# Patient Record
Sex: MALE | Race: BLACK OR AFRICAN AMERICAN | NOT HISPANIC OR LATINO | Employment: FULL TIME | URBAN - METROPOLITAN AREA
[De-identification: names, ages, dates, MRNs, and addresses within clinical notes are randomized per-mention and may not be internally consistent; named-entity substitution may affect disease eponyms.]

---

## 2024-04-19 ENCOUNTER — HOSPITAL ENCOUNTER (EMERGENCY)
Facility: HOSPITAL | Age: 24
Discharge: PSYCHIATRIC OR SUBSTANCE USE FACILITY | End: 2024-04-20
Attending: EMERGENCY MEDICINE

## 2024-04-19 DIAGNOSIS — F19.10 POLYSUBSTANCE ABUSE: ICD-10-CM

## 2024-04-19 DIAGNOSIS — Z00.8 MEDICAL CLEARANCE FOR PSYCHIATRIC ADMISSION: ICD-10-CM

## 2024-04-19 DIAGNOSIS — F30.9 MANIA: Primary | ICD-10-CM

## 2024-04-19 DIAGNOSIS — R41.82 ALTERED MENTAL STATUS, UNSPECIFIED ALTERED MENTAL STATUS TYPE: ICD-10-CM

## 2024-04-19 LAB
ALBUMIN SERPL BCP-MCNC: 4.9 G/DL (ref 3.5–5.2)
ALP SERPL-CCNC: 65 U/L (ref 55–135)
ALT SERPL W/O P-5'-P-CCNC: 23 U/L (ref 10–44)
ANION GAP SERPL CALC-SCNC: 14 MMOL/L (ref 8–16)
APAP SERPL-MCNC: <3 UG/ML (ref 10–20)
AST SERPL-CCNC: 17 U/L (ref 10–40)
BASOPHILS # BLD AUTO: 0.03 K/UL (ref 0–0.2)
BASOPHILS NFR BLD: 0.4 % (ref 0–1.9)
BILIRUB SERPL-MCNC: 0.7 MG/DL (ref 0.1–1)
BUN SERPL-MCNC: 6 MG/DL (ref 6–20)
CALCIUM SERPL-MCNC: 9.9 MG/DL (ref 8.7–10.5)
CHLORIDE SERPL-SCNC: 105 MMOL/L (ref 95–110)
CO2 SERPL-SCNC: 21 MMOL/L (ref 23–29)
CREAT SERPL-MCNC: 1.2 MG/DL (ref 0.5–1.4)
DIFFERENTIAL METHOD BLD: NORMAL
EOSINOPHIL # BLD AUTO: 0 K/UL (ref 0–0.5)
EOSINOPHIL NFR BLD: 0.1 % (ref 0–8)
ERYTHROCYTE [DISTWIDTH] IN BLOOD BY AUTOMATED COUNT: 13.3 % (ref 11.5–14.5)
EST. GFR  (NO RACE VARIABLE): >60 ML/MIN/1.73 M^2
ETHANOL SERPL-MCNC: <10 MG/DL
GLUCOSE SERPL-MCNC: 110 MG/DL (ref 70–110)
HCT VFR BLD AUTO: 42.4 % (ref 40–54)
HGB BLD-MCNC: 14 G/DL (ref 14–18)
IMM GRANULOCYTES # BLD AUTO: 0.01 K/UL (ref 0–0.04)
IMM GRANULOCYTES NFR BLD AUTO: 0.1 % (ref 0–0.5)
LYMPHOCYTES # BLD AUTO: 2.9 K/UL (ref 1–4.8)
LYMPHOCYTES NFR BLD: 40.3 % (ref 18–48)
MCH RBC QN AUTO: 28.5 PG (ref 27–31)
MCHC RBC AUTO-ENTMCNC: 33 G/DL (ref 32–36)
MCV RBC AUTO: 86 FL (ref 82–98)
MONOCYTES # BLD AUTO: 0.5 K/UL (ref 0.3–1)
MONOCYTES NFR BLD: 7.2 % (ref 4–15)
NEUTROPHILS # BLD AUTO: 3.8 K/UL (ref 1.8–7.7)
NEUTROPHILS NFR BLD: 51.9 % (ref 38–73)
NRBC BLD-RTO: 0 /100 WBC
PLATELET # BLD AUTO: 249 K/UL (ref 150–450)
PMV BLD AUTO: 10.1 FL (ref 9.2–12.9)
POCT GLUCOSE: 204 MG/DL (ref 70–110)
POTASSIUM SERPL-SCNC: 3.6 MMOL/L (ref 3.5–5.1)
PROT SERPL-MCNC: 8 G/DL (ref 6–8.4)
RBC # BLD AUTO: 4.92 M/UL (ref 4.6–6.2)
SODIUM SERPL-SCNC: 140 MMOL/L (ref 136–145)
T4 FREE SERPL-MCNC: 1.31 NG/DL (ref 0.71–1.51)
TSH SERPL DL<=0.005 MIU/L-ACNC: 4.02 UIU/ML (ref 0.4–4)
WBC # BLD AUTO: 7.27 K/UL (ref 3.9–12.7)

## 2024-04-19 PROCEDURE — 85025 COMPLETE CBC W/AUTO DIFF WBC: CPT | Performed by: EMERGENCY MEDICINE

## 2024-04-19 PROCEDURE — 93010 ELECTROCARDIOGRAM REPORT: CPT | Mod: ,,, | Performed by: INTERNAL MEDICINE

## 2024-04-19 PROCEDURE — 82077 ASSAY SPEC XCP UR&BREATH IA: CPT | Performed by: EMERGENCY MEDICINE

## 2024-04-19 PROCEDURE — 84439 ASSAY OF FREE THYROXINE: CPT | Performed by: EMERGENCY MEDICINE

## 2024-04-19 PROCEDURE — 99285 EMERGENCY DEPT VISIT HI MDM: CPT | Mod: 25

## 2024-04-19 PROCEDURE — 63600175 PHARM REV CODE 636 W HCPCS: Performed by: EMERGENCY MEDICINE

## 2024-04-19 PROCEDURE — 80143 DRUG ASSAY ACETAMINOPHEN: CPT | Performed by: EMERGENCY MEDICINE

## 2024-04-19 PROCEDURE — 80307 DRUG TEST PRSMV CHEM ANLYZR: CPT | Performed by: EMERGENCY MEDICINE

## 2024-04-19 PROCEDURE — 93005 ELECTROCARDIOGRAM TRACING: CPT

## 2024-04-19 PROCEDURE — 87086 URINE CULTURE/COLONY COUNT: CPT | Performed by: EMERGENCY MEDICINE

## 2024-04-19 PROCEDURE — 81000 URINALYSIS NONAUTO W/SCOPE: CPT | Mod: 59 | Performed by: EMERGENCY MEDICINE

## 2024-04-19 PROCEDURE — 87635 SARS-COV-2 COVID-19 AMP PRB: CPT | Performed by: EMERGENCY MEDICINE

## 2024-04-19 PROCEDURE — 96372 THER/PROPH/DIAG INJ SC/IM: CPT | Performed by: EMERGENCY MEDICINE

## 2024-04-19 PROCEDURE — 84443 ASSAY THYROID STIM HORMONE: CPT | Performed by: EMERGENCY MEDICINE

## 2024-04-19 PROCEDURE — 80053 COMPREHEN METABOLIC PANEL: CPT | Performed by: EMERGENCY MEDICINE

## 2024-04-19 PROCEDURE — 82962 GLUCOSE BLOOD TEST: CPT

## 2024-04-19 RX ORDER — LORAZEPAM 2 MG/ML
2 INJECTION INTRAMUSCULAR
Status: COMPLETED | OUTPATIENT
Start: 2024-04-19 | End: 2024-04-19

## 2024-04-19 RX ORDER — HALOPERIDOL 5 MG/ML
5 INJECTION INTRAMUSCULAR
Status: COMPLETED | OUTPATIENT
Start: 2024-04-19 | End: 2024-04-19

## 2024-04-19 RX ORDER — DIPHENHYDRAMINE HYDROCHLORIDE 50 MG/ML
50 INJECTION INTRAMUSCULAR; INTRAVENOUS
Status: COMPLETED | OUTPATIENT
Start: 2024-04-19 | End: 2024-04-19

## 2024-04-19 RX ADMIN — LORAZEPAM 2 MG: 2 INJECTION INTRAMUSCULAR; INTRAVENOUS at 08:04

## 2024-04-19 RX ADMIN — DIPHENHYDRAMINE HYDROCHLORIDE 50 MG: 50 INJECTION INTRAMUSCULAR; INTRAVENOUS at 08:04

## 2024-04-19 RX ADMIN — HALOPERIDOL LACTATE 5 MG: 5 INJECTION, SOLUTION INTRAMUSCULAR at 08:04

## 2024-04-19 NOTE — ED PROVIDER NOTES
"Emergency Department Encounter  Provider Note    Ronald Cunningham  [id number]  4/19/2024    Evaluation:    History Acquisition:     Chief Complaint   Patient presents with    Altered Mental Status     Patient father reports abnormal behavior and no appetite for the last 2-3 days per girlfriend.  Patient able to follow simple instructions for short periods of time.  Not demonstrating orientation to place, time or event.  When asked questions patient only responds at time and not appropriate to situation.       History of Present Illness:  Ronald Cunningham is a 24 y.o. male who has no past medical history on file.    The patient presents to the ED due to altered mental status.   Patients presents with family members.     On arrival, patient is poorly interactive, dismissive of concerns, and easily distracted.   He states he takes Tramadol but then says "I'm just kidding."  He admits to smoking marijuana.   He denies any complaints.     Additional historians utilized:  Father at bedside: states he just finished a 12 hour shift and picked up the patient from his girlfriend's house. He states the patient seemed confused and tried to get out of the car while he was driving. He thought his kids were outside when they were inside. He has never behaved like this before. Per patient's girlfriend, patient has not been eating and has not slept in several days.   Mother at bedside: states patient is normally calm and respectful. He does smoke marijuana regularly.     Prior medical records were reviewed:   UC visit 12/2022 for impetigo  UC visit 07/2022 for trichomonas exposure  Ped ortho visit 06/2018 for f/u L hand MC fracture    The patient's list of active medical history, family/social history, medications, and allergies as documented has been reviewed.     No past medical history on file.  No past surgical history on file.  Family History   Problem Relation Name Age of Onset    No Known Problems Mother      No Known Problems Sister   "     Social History     Socioeconomic History    Marital status: Single   Tobacco Use    Smoking status: Never   Substance and Sexual Activity    Alcohol use: No    Drug use: No    Sexual activity: Never       Medications:    Allergies:  Review of patient's allergies indicates:  No Known Allergies    Review of Systems   Psychiatric/Behavioral:  Positive for behavioral problems, confusion and sleep disturbance. The patient is nervous/anxious and is hyperactive.          Physical Exam:     Initial Vitals [04/19/24 1839]   BP Pulse Resp Temp SpO2   (!) 152/96 109 15 96.6 °F (35.9 °C) 100 %      MAP       --         Physical Exam    Nursing note and vitals reviewed.  Constitutional: He appears well-developed and well-nourished. He is not diaphoretic. No distress.   HENT:   Head: Normocephalic and atraumatic.   Mouth/Throat: Oropharynx is clear and moist.   Eyes: EOM are normal. Pupils are equal, round, and reactive to light.   Neck: No tracheal deviation present.   Cardiovascular:  Normal rate, regular rhythm, normal heart sounds and intact distal pulses.           Pulmonary/Chest: Breath sounds normal. No stridor. No respiratory distress.   Abdominal: Abdomen is soft. He exhibits no distension and no mass. There is no abdominal tenderness.   Musculoskeletal:         General: No edema. Normal range of motion.     Neurological: He is alert. He is disoriented. No cranial nerve deficit or sensory deficit. GCS eye subscore is 4. GCS verbal subscore is 5. GCS motor subscore is 6.   Skin: Skin is warm and dry. Capillary refill takes less than 2 seconds. No rash noted.   Psychiatric: Thought content normal. His affect is labile. His speech is delayed and tangential. He is withdrawn. He is not actively hallucinating. Cognition and memory are impaired. He expresses impulsivity. He is noncommunicative. He is inattentive.         Differential Diagnoses:   Based on available information and initial assessment, Differential Diagnosis  includes, but is not limited to:  CVA/TIA, seizure, status epilepticus, post-ictal state, meningitis/encephalitis, sepsis, MI/ACS, arrhythmia, syncope, intracranial mass/hemorrhage, head trauma, anaphylaxis, substance abuse, alcohol intoxication/withdrawal, medication reaction, intentional medication overdose, neuroleptic malignant syndrome, serotonin syndrome, CO poisoning, hypoxia/hypercapnea, hepatic encephalopathy, metabolic disturbance, thyroid disease, hypoglycemia.      ED Management:   Procedures    Orders Placed This Encounter    Urine culture    CT Head Without Contrast    CBC auto differential    Comprehensive metabolic panel    TSH    Urinalysis, Reflex to Urine Culture Urine, Clean Catch    Drug screen panel, emergency    Ethanol    Acetaminophen level    T4, Free    Urinalysis Microscopic    Diet Adult Regular (IDDSI Level 7)    Vital signs while awake    Undress patient and allow them to wear facility provided apparel.    Direct Psych Observation    Inpatient consult to Psychiatry    POCT COVID-19 Rapid Screening    EKG 12-lead    PFC Facilitated Request    POCT glucose    haloperidol lactate injection 5 mg    LORazepam injection 2 mg    diphenhydrAMINE injection 50 mg    PEC/Psych Hold - Physicians Emergency Certificate / 72 Hour Psych Hold          EKG:   EKG interpretation by ED attending physician:  NSR, rate 85, no ST changes, no ischemia, normal intervals.  No prior for comparison.       Labs:     Labs Reviewed   COMPREHENSIVE METABOLIC PANEL - Abnormal; Notable for the following components:       Result Value    CO2 21 (*)     All other components within normal limits   TSH - Abnormal; Notable for the following components:    TSH 4.019 (*)     All other components within normal limits   URINALYSIS, REFLEX TO URINE CULTURE - Abnormal; Notable for the following components:    Protein, UA Trace (*)     Leukocytes, UA Trace (*)     All other components within normal limits    Narrative:      Specimen Source->Urine   DRUG SCREEN PANEL, URINE EMERGENCY - Abnormal; Notable for the following components:    Methadone metabolites Presumptive Positive (*)     Amphetamine Screen, Ur Presumptive Positive (*)     THC Presumptive Positive (*)     All other components within normal limits    Narrative:     Specimen Source->Urine   ACETAMINOPHEN LEVEL - Abnormal; Notable for the following components:    Acetaminophen (Tylenol), Serum <3.0 (*)     All other components within normal limits   URINALYSIS MICROSCOPIC - Abnormal; Notable for the following components:    WBC, UA 13 (*)     All other components within normal limits    Narrative:     Specimen Source->Urine   POCT GLUCOSE - Abnormal; Notable for the following components:    POCT Glucose 204 (*)     All other components within normal limits   CULTURE, URINE   CBC W/ AUTO DIFFERENTIAL   ALCOHOL,MEDICAL (ETHANOL)   T4, FREE   SARS-COV-2 RDRP GENE    Narrative:     This test utilizes isothermal nucleic acid amplification technology to detect the SARS-CoV-2 RdRp nucleic acid segment. The analytical sensitivity (limit of detection) is 500 copies/swab.     A POSITIVE result is indicative of the presence of SARS-CoV-2 RNA; clinical correlation with patient history and other diagnostic information is necessary to determine patient infection status.    A NEGATIVE result means that SARS-CoV-2 nucleic acids are not present above the limit of detection. A NEGATIVE result should be treated as presumptive. It does not rule out the possibility of COVID-19 and should not be the sole basis for treatment decisions. If COVID-19 is strongly suspected based on clinical and exposure history, re-testing using an alternate molecular assay should be considered.     Commercial kits are provided by [employer].   _________________________________________________________________   The authorized Fact Sheet for Healthcare Providers and the authorized Fact Sheet for Patients of the  ID NOW COVID-19 are available on the FDA website:    https://www.fda.gov/media/149179/download      https://www.fda.gov/media/699229/download         Independent review of the labs ordered include:   See ED course    Imaging:     Imaging Results              CT Head Without Contrast (Final result)  Result time 04/19/24 20:12:06      Final result by Adalid Romo MD (04/19/24 20:12:06)                   Impression:      Allowing for some artifacts, CT demonstrates no acute intracranial abnormality.    Scattered mucosal disease in the incompletely visualized paranasal sinuses.      Electronically signed by: Adalid Romo  Date:    04/19/2024  Time:    20:12               Narrative:    EXAMINATION:  CT HEAD WITHOUT CONTRAST    CLINICAL HISTORY:  Mental status change, unknown cause;    TECHNIQUE:  Low dose axial images were obtained through the head.  Coronal and sagittal reformations were also performed. Contrast was not administered.    COMPARISON:  None.    FINDINGS:  Artifacts related to beam hardening and/or motion degrade portions of the scan.  Allowing for these, at this time CT demonstrates no evidence of hyperattenuating acute intracranial hemorrhage, discrete acute large vascular territory brain infarct, intracranial mass or mass effect, midline shift, brain herniation, pathologic extra-axial fluid collection, hydrocephalus, pneumocephalus, or other acute intracranial abnormality.  Gray-white differentiation appears preserved.  The mastoid air cells, pneumatized portions of the petrous and squamous temporal bones, and middle ears remain well aerated.  There is scattered mucosal disease within the paranasal sinuses, including within the very large lateral recesses of the sphenoid sinuses bilaterally.    The orbits demonstrate no acute osseous or soft tissue abnormalities                                         Medications Given:     Medications   haloperidol lactate injection 5 mg (5 mg Intramuscular Given  4/19/24 2001)   LORazepam injection 2 mg (2 mg Intramuscular Given 4/19/24 2001)   diphenhydrAMINE injection 50 mg (50 mg Intramuscular Given 4/19/24 2001)        Medical Decision Making:    Additional Consideration:   Additional testing considered during clinical course: none    Social determinants of health considered during development of treatment plan include: poor access to care, marijuana use    Current co-morbidities considered which impacted clinical decision making: none    Case discussed with additional provider: discussed with Tele-Psych physician, Dr. Miller, who agrees with PEC and Psych placement. Family updated and comfortable with plan.     ED Course as of 04/20/24 0219 Fri Apr 19, 2024 1845 SpO2: 100 % [SS]   1848 Resp: 15 [SS]   1848 Pulse: 109 [SS]   1848 Temp Source: Oral [SS]   1848 Temp: 96.6 °F (35.9 °C) [SS]   1848 BP(!): 152/96 [SS]   1848 POCT glucose(!)  Elevated  [SS]   1940 Acetaminophen level(!)  WNL [SS]   1940 Ethanol  Negative  [SS]   1940 Comprehensive metabolic panel(!)  WNL [SS]   1940 CBC auto differential  WNL [SS]   2035 CT Head Without Contrast  CT head independently interpreted: no intracranial hemorrhage, mass effect, or midline shift.  Agree with radiologist interpretation.    [SS]   2035 TSH(!)  TSH very mildly elevated, T4 normal [SS]   2035 T4, Free  WNL [SS]   Sat Apr 20, 2024   0053 Drug screen panel, emergency(!)  UDS positive for multiple substances including methadone, amphetamines, and marijuana.  [SS]   0054 Urinalysis, Reflex to Urine Culture Urine, Clean Catch(!) [SS]   0054 Urinalysis Microscopic(!)  UA inconsistent with infection.  [SS]      ED Course User Index  [SS] Dannie Gonzalez MD            Medical Decision Making  25 yo M with AMS.    After complete evaluation, including thorough history and physical exam, the patient's symptoms are possibly due to acute psychiatric illness, lexi vs acute psychosis. There are no features of history or physical  exam indicative of acute medical illness. Vital signs have been stable throughout ED course. The patient has no history of psychiatric illness, and is not currently on psychiatric medication.     Due to patient's presentation, history, and current condition, a PEC was completed for the safety of the patient and medical staff during evaluation and management.  One-to-one observation was initiated.     Labs were obtained, unremarkable.  UDS + methadone, amphetamines, and marijuana.  Patient did require IM medications for safe evaluation.    The patient is currently medically cleared for psychiatric evaluation and transfer to inpatient psychiatric facility as necessary.      Problems Addressed:  Altered mental status, unspecified altered mental status type: acute illness or injury  Mouna: acute illness or injury  Medical clearance for psychiatric admission: acute illness or injury    Amount and/or Complexity of Data Reviewed  Independent Historian: parent and guardian  External Data Reviewed: notes.  Labs: ordered. Decision-making details documented in ED Course.  Radiology: ordered and independent interpretation performed. Decision-making details documented in ED Course.    Risk  Prescription drug management.  Decision regarding hospitalization.  Diagnosis or treatment significantly limited by social determinants of health.    Critical Care  Total time providing critical care: 30 minutes        Clinical Impression:       ICD-10-CM ICD-9-CM   1. Mouna  F30.9 296.00   2. Medical clearance for psychiatric admission  Z00.8 V70.8   3. Altered mental status, unspecified altered mental status type  R41.82 780.97   4. Polysubstance abuse  F19.10 305.90         Follow-up Information    None          ED Disposition Condition    Transfer to Psych Facility Stable            On re-evaluation, the patient's status has remained stable.  At this time, I believe the patient should be transferred to Psych facility for further  evaluation and management of lexi, acute psychosis.    The patient and family were updated with test results, overall impression, and further plan of care. All questions were answered. The patient expressed understanding and agrees with the current plan.       Dannie Gonzalez MD  04/20/24 0224

## 2024-04-19 NOTE — ED NOTES
Per mother patient with no history of similar behavior and no psychiatric history.  Has not been sleeping for the last three days.

## 2024-04-20 VITALS
HEIGHT: 72 IN | TEMPERATURE: 98 F | DIASTOLIC BLOOD PRESSURE: 77 MMHG | RESPIRATION RATE: 18 BRPM | BODY MASS INDEX: 20.99 KG/M2 | SYSTOLIC BLOOD PRESSURE: 120 MMHG | WEIGHT: 155 LBS | HEART RATE: 83 BPM | OXYGEN SATURATION: 100 %

## 2024-04-20 LAB
AMPHET+METHAMPHET UR QL: ABNORMAL
BARBITURATES UR QL SCN>200 NG/ML: NEGATIVE
BENZODIAZ UR QL SCN>200 NG/ML: NEGATIVE
BILIRUB UR QL STRIP: NEGATIVE
BZE UR QL SCN: NEGATIVE
CANNABINOIDS UR QL SCN: ABNORMAL
CLARITY UR: CLEAR
COLOR UR: YELLOW
CREAT UR-MCNC: 317.5 MG/DL (ref 23–375)
CTP QC/QA: YES
GLUCOSE UR QL STRIP: NEGATIVE
HGB UR QL STRIP: NEGATIVE
KETONES UR QL STRIP: NEGATIVE
LEUKOCYTE ESTERASE UR QL STRIP: ABNORMAL
METHADONE UR QL SCN>300 NG/ML: ABNORMAL
MICROSCOPIC COMMENT: ABNORMAL
NITRITE UR QL STRIP: NEGATIVE
OPIATES UR QL SCN: NEGATIVE
PCP UR QL SCN>25 NG/ML: NEGATIVE
PH UR STRIP: 6 [PH] (ref 5–8)
PROT UR QL STRIP: ABNORMAL
RBC #/AREA URNS HPF: 2 /HPF (ref 0–4)
SARS-COV-2 RDRP RESP QL NAA+PROBE: NEGATIVE
SP GR UR STRIP: 1.03 (ref 1–1.03)
TOXICOLOGY INFORMATION: ABNORMAL
URN SPEC COLLECT METH UR: ABNORMAL
UROBILINOGEN UR STRIP-ACNC: NEGATIVE EU/DL
WBC #/AREA URNS HPF: 13 /HPF (ref 0–5)

## 2024-04-20 PROCEDURE — G0425 INPT/ED TELECONSULT30: HCPCS | Mod: GT,,, | Performed by: PSYCHIATRY & NEUROLOGY

## 2024-04-20 NOTE — ED NOTES
Patient given PO fluids.  Tolerating well.  Patient ambulatory with steady gait to restroom with father providing assistance.  Steady gait.  Patient alert.  Answering questions appropriately.  Sleeps between care.  RR regular and non labored.  Skin W/D/P.  No complaints or noted concerns.  MD at bedside speaking with family.  Family updated on plan of care.

## 2024-04-20 NOTE — ED NOTES
Aroused easily.  Awake.  Alert.  No complaints or noted concerns.  Safety maintained with direct observation.  Family present.  Bed locked in low position.  SR up x 2.

## 2024-04-20 NOTE — ED NOTES
Diet provided.  Patient tolerated well.  Care transferred to \[facility]\"".  Security and tech with patient to secure car for transport.  Patient alert.  Oriented x 4.  Speech clear and appropriate.  RR regular and non labored.  Calm and cooperative.  Participating in care plan.  No complaints or noted concerns.  Skin W/D/P.  Updated on care plan.

## 2024-04-20 NOTE — CONSULTS
"[facility]  Psychiatry  Telepsychiatry Consult Note    Please see previous notes:    Patient agreeable to consultation via telepsychiatry.    Tele-Consultation from Psychiatry started: 4/20/2024 at 1:15am  The chief complaint leading to psychiatric consultation is: AMS  This consultation was requested by Dr Gonzalez, the Emergency Department attending physician.  The location of the consulting psychiatrist is  [place] .  The patient location is  [facility] EMERGENCY DEPARTMENT   The patient arrived at the ED at: [facility]    Also present with the patient at the time of the consultation: nobody    Patient Identification:   Ronald Cunningham is a 24 y.o. male.    Patient information was obtained from patient and past medical records.  Patient presented voluntarily to the Emergency Department     Consults  Teleconsult Time Documentation  Subjective:     History of Present Illness:  23yo M brought in by family for altered mentation. UDS + for amphetamine, THC, and methadone.    Per ED note-  "    Altered Mental Status        Patient father reports abnormal behavior and no appetite for the last 2-3 days per girlfriend.  Patient able to follow simple instructions for short periods of time.  Not demonstrating orientation to place, time or event.  When asked questions patient only responds at time and not appropriate to situation.         History of Present Illness:  Ronald Cunningham is a 24 y.o. male who has no past medical history on file.     The patient presents to the ED due to altered mental status.   Patients presents with family members.      On arrival, patient is poorly interactive, dismissive of concerns, and easily distracted.   He states he takes Tramadol but then says "I'm just kidding."  He admits to smoking marijuana.   He denies any complaints.      Additional historians utilized:  Father at bedside: states he just finished a 12 hour shift and picked up the patient from his girlfriend's house. He states the patient " "seemed confused and tried to get out of the car while he was driving. He thought his kids were outside when they were inside. He has never behaved like this before.  Mother at bedside: states patient is normally calm and respectful. He does smoke marijuana regularly.      Prior medical records were reviewed:   UC visit 12/2022 for impetigo  UC visit 07/2022 for trichomonas exposure  Ped ortho visit 06/2018 for f/u L hand MC fracture"    Patient received Haldol 5mg, Ativan 2mg, and benadryl 50mg x 1 prior to me seeing him.   On interview, patient had significant thought blocking. He was somewhat lethargic, kept falling asleep repeatedly. He reports "I was sleeping bad". He reports one day of trouble sleeping.  Denied depression or anxiety  Denied SI or HI  Denied AVH  This is the extent of patients complaints at this time.  Unable to obtain ros due to inability to participate  Patient was hypermotoric on presentation to the ED.  I spoke with patients parents. They reports he was not acting normal, trying to get out of the car when it was moving, was paranoid, disoriented, drooling from his mouth. Parents report patient has been stressed out about his relationship. They report he had not slept for 3 days. Family not comfortable taking patient home.     Psychiatric History:   Previous Psychiatric Hospitalizations: No   Previous Medication Trials: No   Previous Suicide Attempts: no   History of Violence: no  History of Depression: no  History of Mouna: no  History of Auditory/Visual Hallucination no  History of Delusions: no  Outpatient psychiatrist (current & past): No    Substance Abuse History:  Tobacco:No  Alcohol: No  Illicit Substances:endorsed smoking MJ  Detox/Rehab: No    Legal History: Past charges/incarcerations: No     Family Psychiatric History: denied      Social History:  Lives with his lashonda mother. Used to work at [employer]. Denied access to firearms.       Psychiatric Mental Status Exam:  Arousal: " "lethargic  Sensorium/Orientation: oriented to person, place, situation  Behavior/Cooperation: reluctant to participate   Speech: articulation error, delayed, increased latency of response  Language: not tested  Mood: " fine "   Affect: blunted  Thought Process: blocked  Thought Content:   Auditory hallucinations: NO  Visual hallucinations: NO  Paranoia: NO  Delusions:  NO  Suicidal ideation: NO  Homicidal ideation: NO  Attention/Concentration:  impaired  Memory:    Recent:  impaired   Remote: impaired     Fund of Knowledge: Impaired   Abstract reasoning: unable to obtain due to inability to participate  Insight: poor awareness of illness  Judgment:  poor      Past Medical History: No past medical history on file.   Laboratory Data:   Labs Reviewed   COMPREHENSIVE METABOLIC PANEL - Abnormal; Notable for the following components:       Result Value    CO2 21 (*)     All other components within normal limits   TSH - Abnormal; Notable for the following components:    TSH 4.019 (*)     All other components within normal limits   URINALYSIS, REFLEX TO URINE CULTURE - Abnormal; Notable for the following components:    Protein, UA Trace (*)     Leukocytes, UA Trace (*)     All other components within normal limits    Narrative:     Specimen Source->Urine   DRUG SCREEN PANEL, URINE EMERGENCY - Abnormal; Notable for the following components:    Methadone metabolites Presumptive Positive (*)     Amphetamine Screen, Ur Presumptive Positive (*)     THC Presumptive Positive (*)     All other components within normal limits    Narrative:     Specimen Source->Urine   ACETAMINOPHEN LEVEL - Abnormal; Notable for the following components:    Acetaminophen (Tylenol), Serum <3.0 (*)     All other components within normal limits   URINALYSIS MICROSCOPIC - Abnormal; Notable for the following components:    WBC, UA 13 (*)     All other components within normal limits    Narrative:     Specimen Source->Urine   POCT GLUCOSE - Abnormal; " Notable for the following components:    POCT Glucose 204 (*)     All other components within normal limits   CULTURE, URINE   CBC W/ AUTO DIFFERENTIAL   ALCOHOL,MEDICAL (ETHANOL)   T4, FREE   SARS-COV-2 RDRP GENE    Narrative:     This test utilizes isothermal nucleic acid amplification technology to detect the SARS-CoV-2 RdRp nucleic acid segment. The analytical sensitivity (limit of detection) is 500 copies/swab.     A POSITIVE result is indicative of the presence of SARS-CoV-2 RNA; clinical correlation with patient history and other diagnostic information is necessary to determine patient infection status.    A NEGATIVE result means that SARS-CoV-2 nucleic acids are not present above the limit of detection. A NEGATIVE result should be treated as presumptive. It does not rule out the possibility of COVID-19 and should not be the sole basis for treatment decisions. If COVID-19 is strongly suspected based on clinical and exposure history, re-testing using an alternate molecular assay should be considered.     Commercial kits are provided by [employer].   _________________________________________________________________   The authorized Fact Sheet for Healthcare Providers and the authorized Fact Sheet for Patients of the ID NOW COVID-19 are available on the FDA website:    https://www.fda.gov/media/446205/download      https://www.fda.gov/media/278808/download             Allergies:   Review of patient's allergies indicates:  No Known Allergies    Medications in ER:   Medications   haloperidol lactate injection 5 mg (5 mg Intramuscular Given 4/19/24 2001)   LORazepam injection 2 mg (2 mg Intramuscular Given 4/19/24 2001)   diphenhydrAMINE injection 50 mg (50 mg Intramuscular Given 4/19/24 2001)       Medications at home: unknown    No new subjective & objective note has been filed under this hospital service since the last note was generated.      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 Inpatient psychiatric tx once medically cleared.  Ativan 2mg IV/IM q 4hours prn severe agitation   Will defer to inpatient psychiatric team to start/modify scheduled medications.    Time with patient, speaking with family, coordinating care: 40min      More than 50% of the time was spent counseling/coordinating care    Consulting clinician was informed of the encounter and consult note.    Consultation ended: 4/20/2024 at 2:00am    Rc Miller MD  Psychiatry  [facility]

## 2024-04-20 NOTE — ED NOTES
Attempt to assist patient to void with tech present.  Patient requiring one person stand by assist for ambulation and ADL assistance  RR regular and non labored.  Given simple directions.  Following some simple directions but not all.  Appears to be oriented to person at this time.  Patient tossed urinal in toilet and did not produce any urine.  Patient assisted back to bed at this time.  Mother present.  Blankets and pillow provided.  Patient encouraged to drink fluids but declined at this time.

## 2024-04-20 NOTE — ED NOTES
Acknowledgement of Notification of Rights placed in MR bin at this time.  Copy of rights given to patient and father after arrival to department.

## 2024-04-21 LAB — BACTERIA UR CULT: NORMAL

## 2024-04-23 LAB
OHS QRS DURATION: 90 MS
OHS QTC CALCULATION: 418 MS